# Patient Record
Sex: FEMALE | Employment: OTHER | URBAN - METROPOLITAN AREA
[De-identification: names, ages, dates, MRNs, and addresses within clinical notes are randomized per-mention and may not be internally consistent; named-entity substitution may affect disease eponyms.]

---

## 2024-03-19 ENCOUNTER — OFFICE VISIT (OUTPATIENT)
Dept: OBSTETRICS AND GYNECOLOGY | Facility: CLINIC | Age: 49
End: 2024-03-19
Payer: COMMERCIAL

## 2024-03-19 VITALS
WEIGHT: 166.4 LBS | BODY MASS INDEX: 27.72 KG/M2 | DIASTOLIC BLOOD PRESSURE: 84 MMHG | HEIGHT: 65 IN | SYSTOLIC BLOOD PRESSURE: 118 MMHG

## 2024-03-19 DIAGNOSIS — N95.1 MENOPAUSAL SYNDROME ON HORMONE REPLACEMENT THERAPY: Primary | ICD-10-CM

## 2024-03-19 DIAGNOSIS — Z79.890 MENOPAUSAL SYNDROME ON HORMONE REPLACEMENT THERAPY: Primary | ICD-10-CM

## 2024-03-19 PROCEDURE — 99204 OFFICE O/P NEW MOD 45 MIN: CPT | Performed by: NURSE PRACTITIONER

## 2024-03-19 RX ORDER — PROGESTERONE 200 MG/1
CAPSULE ORAL
Qty: 14 CAPSULE | Refills: 11 | Status: SHIPPED | OUTPATIENT
Start: 2024-03-19 | End: 2024-05-13 | Stop reason: SDUPTHER

## 2024-03-19 RX ORDER — ESTRADIOL 1 MG/1
1 TABLET ORAL DAILY
Qty: 30 TABLET | Refills: 11 | Status: SHIPPED | OUTPATIENT
Start: 2024-03-19 | End: 2024-05-14

## 2024-03-19 ASSESSMENT — ENCOUNTER SYMPTOMS
EYES NEGATIVE: 0
ENDOCRINE NEGATIVE: 0
ALLERGIC/IMMUNOLOGIC NEGATIVE: 0
GASTROINTESTINAL NEGATIVE: 0
HEMATOLOGIC/LYMPHATIC NEGATIVE: 0
CONSTITUTIONAL NEGATIVE: 0
CARDIOVASCULAR NEGATIVE: 0
MUSCULOSKELETAL NEGATIVE: 0
RESPIRATORY NEGATIVE: 0
NEUROLOGICAL NEGATIVE: 0
PSYCHIATRIC NEGATIVE: 0

## 2024-03-19 ASSESSMENT — PAIN SCALES - GENERAL: PAINLEVEL: 0-NO PAIN

## 2024-03-19 NOTE — PROGRESS NOTES
Subjective   Patient ID: Shantel Almazan is a 49 y.o. female who presents for Hormone Replacement .  HPI  Concerns:     Menopausal age:     Any Contraindications to HT: personal h/o breast cancer, estrogen sensitive cancer, dementia, stroke, MI, VTE or inherited high risk for VTE, SCAD:   h/o congenital heart disease (increased risk of DVT)     contraception:  vasectomy  HT:   use of OTC remedies:   bioidenticals:     Vaginal bleeding:    VMS:   Sleep difficulties:   Mood changes:   Joint pain:   Brain fog/difficulty concentrating:     GSM:   are you sexually active:   dyspareunia:   decrease in desire:   difficulty reaching orgasm:    Urinary Incontinence:        H/O Pregnancies:   Fractures:   H/O abnormal pap:     Lipids:    calcium scoring:      FH:   breast cancer:    ovarian cancer:   colon cancer:   pancreatic cancer:     Social history:  lives with:   H/O trauma:   occupation:    Exercise:    weight bearing:      Review of Systems    Objective   Physical Exam    Assessment/Plan   {Assess/PlanSmartLinks:45817}         CIRILO Rojo-CNP 03/19/24 10:24 AM

## 2024-03-19 NOTE — PROGRESS NOTES
Subjective   Patient ID: Shantel Almazan is a 49 y.o. female who presents for Hormone Replacement .  HPI      Menopausal age: perimenopausal - still getting regular monthly periods    Any Contraindications to HT: personal h/o breast cancer, estrogen sensitive cancer, dementia, stroke, MI, VTE or inherited high risk for VTE, SCAD: none  h/o congenital heart disease (increased risk of DVT)- no    contraception:  vasectomy  HT: none  use of OTC remedies: none  bioidenticals: none    Vaginal bleeding:  regular and monthly- states they are shorter and heavier   VMS: yes  Sleep difficulties: yes  Mood changes: yes  Joint pain: no  Brain fog/difficulty concentrating: yes    GSM: yes  are you sexually active: yes  dyspareunia: sometimes  decrease in desire: yes  difficulty reaching orgasm: yes  Urinary Incontinence: none       Fractures: no  H/O abnormal pap: no       FH:   breast cancer:  no  ovarian cancer: no  colon cancer: no  pancreatic cancer: no    Social history:  lives with:  and 2 daughters  H/O trauma: no  occupation:  psychologist  Exercise: yes-pilates, walks and runs       Review of Systems    Objective   Physical Exam    Assessment/Plan   Diagnoses and all orders for this visit:  Menopausal syndrome on hormone replacement therapy  -     progesterone (Prometrium) 200 mg capsule; take one pill by mouth at bedtime for 14 days/month, starting 4/1/24  -     estradiol (Estrace) 1 mg tablet; Take 1 tablet (1 mg) by mouth once daily.    -Patient states she would like to start at the 1 mg PO estradiol dose. Side effects such as headache, bloating and breast tenderness discussed with patient.    -Patient to begin progesterone 200 mg capsule at bedtime on April 1, 2024 for 14 days. Educated patient that she can do this at the beginning of every month for 14 days. Asked patient to watch her bleeding pattern and reach out if anything changes.  -Patient to follow up in 6 weeks on how things are going.  -Patient  asked to reach out if questions or concerns arise.     MCKINLEY PEARSON 03/19/24 10:27 AM

## 2024-04-19 ENCOUNTER — HOSPITAL ENCOUNTER (OUTPATIENT)
Dept: RADIOLOGY | Facility: CLINIC | Age: 49
Discharge: HOME | End: 2024-04-19
Payer: COMMERCIAL

## 2024-04-19 VITALS — BODY MASS INDEX: 26.29 KG/M2 | WEIGHT: 158 LBS

## 2024-04-19 DIAGNOSIS — Z12.31 ENCOUNTER FOR SCREENING MAMMOGRAM FOR MALIGNANT NEOPLASM OF BREAST: ICD-10-CM

## 2024-04-19 PROCEDURE — 77067 SCR MAMMO BI INCL CAD: CPT

## 2024-04-19 PROCEDURE — 77067 SCR MAMMO BI INCL CAD: CPT | Mod: BILATERAL PROCEDURE | Performed by: RADIOLOGY

## 2024-04-19 PROCEDURE — 77063 BREAST TOMOSYNTHESIS BI: CPT | Mod: BILATERAL PROCEDURE | Performed by: RADIOLOGY

## 2024-04-22 ENCOUNTER — HOSPITAL ENCOUNTER (OUTPATIENT)
Dept: RADIOLOGY | Facility: EXTERNAL LOCATION | Age: 49
Discharge: HOME | End: 2024-04-22
Payer: COMMERCIAL

## 2024-04-29 ENCOUNTER — APPOINTMENT (OUTPATIENT)
Dept: OBSTETRICS AND GYNECOLOGY | Facility: CLINIC | Age: 49
End: 2024-04-29
Payer: COMMERCIAL

## 2024-05-13 ENCOUNTER — TELEMEDICINE (OUTPATIENT)
Dept: OBSTETRICS AND GYNECOLOGY | Facility: CLINIC | Age: 49
End: 2024-05-13
Payer: COMMERCIAL

## 2024-05-13 DIAGNOSIS — Z79.890 MENOPAUSAL SYNDROME ON HORMONE REPLACEMENT THERAPY: Primary | ICD-10-CM

## 2024-05-13 DIAGNOSIS — N95.1 MENOPAUSAL SYNDROME ON HORMONE REPLACEMENT THERAPY: Primary | ICD-10-CM

## 2024-05-13 PROCEDURE — 99441 PR PHYS/QHP TELEPHONE EVALUATION 5-10 MIN: CPT | Performed by: NURSE PRACTITIONER

## 2024-05-13 RX ORDER — PROGESTERONE 200 MG/1
200 CAPSULE ORAL DAILY
Qty: 30 CAPSULE | Refills: 11 | Status: SHIPPED | OUTPATIENT
Start: 2024-05-13

## 2024-05-13 RX ORDER — ESCITALOPRAM OXALATE 5 MG/1
5 TABLET ORAL
COMMUNITY
Start: 2023-06-06

## 2024-05-13 NOTE — PROGRESS NOTES
"Subjective   Patient ID: Shantel Almazan is a 49 y.o. female who presents for No chief complaint on file..  HPI  Follow up from 3/2024  Perimenopausal   VMS: yes  Sleep difficulties: yes  Mood changes: yes  Brain fog/difficulty concentrating: yes  GSM: yes  dyspareunia: sometimes  decrease in desire: yes  difficulty reaching orgasm: yes    Decision for 1mg po estradiol; 200mg po progesterone for 14 days/month    Today she states:  \"I definitely feel better\"  Restarted 5mg lexapro  Better sleep  VMS: for the 2 weeks she is off progesterone  Moods: improved  Weight loss  Brain fog: improved  GSM: improved     Review of Systems    Objective   Physical Exam    Assessment/Plan   Diagnoses and all orders for this visit:  Menopausal syndrome on hormone replacement therapy  -     progesterone (Prometrium) 200 mg capsule; Take 1 capsule (200 mg) by mouth once daily. take one pill by mouth at bedtime    Will continue with 1mg po estradiol; will increase progesterone from 200mg for 14 days/month to nighlty  Pt asked to contact me with an update in approximately one month       CIRILO Rojo-CNP 05/13/24 3:22 PM   "

## 2024-05-14 DIAGNOSIS — Z79.890 MENOPAUSAL SYNDROME ON HORMONE REPLACEMENT THERAPY: Primary | ICD-10-CM

## 2024-05-14 DIAGNOSIS — N95.1 MENOPAUSAL SYNDROME ON HORMONE REPLACEMENT THERAPY: Primary | ICD-10-CM

## 2024-05-14 RX ORDER — ESTRADIOL 1 MG/1
1 TABLET ORAL DAILY
Qty: 90 TABLET | Refills: 3 | Status: SHIPPED | OUTPATIENT
Start: 2024-05-14

## 2024-07-30 ENCOUNTER — APPOINTMENT (OUTPATIENT)
Dept: INTEGRATIVE MEDICINE | Facility: CLINIC | Age: 49
End: 2024-07-30

## 2024-07-30 PROCEDURE — MASS60G MASSAGE 60 MINUTES: Performed by: MASSAGE THERAPIST

## 2024-07-30 PROCEDURE — CYTAX SALES TAX CUYAHOGA COUNT: Performed by: MASSAGE THERAPIST

## 2024-07-30 SDOH — SOCIAL STABILITY: SOCIAL NETWORK: I HAVE TROUBLE DOING ALL OF MY USUAL WORK (INCLUDE WORK AT HOME): SOMETIMES

## 2024-07-30 SDOH — SOCIAL STABILITY: SOCIAL NETWORK: I HAVE TROUBLE DOING ALL OF MY REGULAR LEISURE ACTIVITIES WITH OTHERS: RARELY

## 2024-07-30 SDOH — ECONOMIC STABILITY: FOOD INSECURITY: WITHIN THE PAST 12 MONTHS, YOU WORRIED THAT YOUR FOOD WOULD RUN OUT BEFORE YOU GOT MONEY TO BUY MORE.: NEVER TRUE

## 2024-07-30 SDOH — SOCIAL STABILITY: SOCIAL NETWORK: I HAVE TROUBLE DOING ALL OF THE FAMILY ACTIVITIES THAT I WANT TO DO: SOMETIMES

## 2024-07-30 SDOH — SOCIAL STABILITY: SOCIAL NETWORK: PROMIS ABILITY TO PARTICIPATE IN SOCIAL ROLES & ACTIVITIES T-SCORE: 48

## 2024-07-30 SDOH — SOCIAL STABILITY: SOCIAL NETWORK

## 2024-07-30 SDOH — ECONOMIC STABILITY: FOOD INSECURITY: WITHIN THE PAST 12 MONTHS, THE FOOD YOU BOUGHT JUST DIDN'T LAST AND YOU DIDN'T HAVE MONEY TO GET MORE.: NEVER TRUE

## 2024-07-30 SDOH — SOCIAL STABILITY: SOCIAL NETWORK: I HAVE TROUBLE DOING ALL OF THE ACTIVITIES WITH FRIENDS THAT I WANT TO DO: RARELY

## 2024-07-30 ASSESSMENT — PROMIS GLOBAL HEALTH SCALE
RATE_PHYSICAL_HEALTH: VERY GOOD
RATE_AVERAGE_FATIGUE: MILD
RATE_SOCIAL_SATISFACTION: GOOD
EMOTIONAL_PROBLEMS: RARELY
CARRYOUT_SOCIAL_ACTIVITIES: VERY GOOD
RATE_GENERAL_HEALTH: VERY GOOD
RATE_MENTAL_HEALTH: GOOD
CARRYOUT_PHYSICAL_ACTIVITIES: COMPLETELY
RATE_QUALITY_OF_LIFE: VERY GOOD
RATE_AVERAGE_PAIN: 3

## 2024-07-31 NOTE — PROGRESS NOTES
Massage Therapy Visit:     Shantel Almazan was self-referred.    Condition of Client Subjective :  Patient ID: Shantel Almazan is a 49 y.o. female who presents for reason for visit of Back Pain and Muscle tension release.  HPI    Session Information  Visit Type: New patient  Description of present complaint: Stress, Range of motion (ROM), Muscle tension, Discomfort, Fatigue, Chronic pain, Gait issues    Review of Systems    Objective   Pre-treatment Assessment  Condition of Client Note: First session.. Lumbar pain, tightness on neck, shoulders.. active individual in a regular exercise & wellness protocol... wants to add therapeutic massage as part of her pain management protocol, to lower pain levels with regular visits, along with PT sessions.    Physical Exam    Actions Assessment/Plan :    Massage Treatment  Patient Position: Table, Supine, Prone  Positioning Assistance: Did not need assistance, Pillow(s)/bolster under knees while supine, Pillow(s)/bolster under anles while prone  Massage Technique: Therapeutic massage, Relaxation massage, Myofascial release, Superficial fascial release, Post-isometric muscle release (PNF), Stretching, Soft tissue mobilization, Cranio-sacral therapy  Area/Body Region: Upper body  Pressure Scale: 2 - Mild pressure, 3 - Medium pressure, 4 - Moderate pressure  Action Note: Supine, upper body, kneading, stripping, palming, effleurage, petrissage, cross fiber, on Cervicals, Masseters, Platysma, SCM, SITS, Levator Scapulae, Trapezius, Rhomboids, upper Pectoralis, Deltoids.  UE, stretching, cross fiber, palming, kneading, effleurage, on Deltoids, Biceps, Triceps, forearm muscles, hand muscles.  LE, kneading, stripping, palming, knuckle, effleurage, petrissage, cross fiber, stretching, on Vastus group, TFL, lateral approach to Hip Flexors, Longissimus, Paraspinals, Iliocostalis, Hamstrings, Sartorius, Gracilis.   Prone, Relaxation massage techniques: kneading, stripping, palming,  knuckle, effleurage, petrissage, cross fiber, stretching, on SI joint, upper Gluteus Max, erectors, Longissimus, QL, Obliques, paraspinals, SITS. Cervical, Trapezius.    Response:  Post-treatment Assessment  Patient Noted Improvement of the Following Symptoms: Muscle tension    Evaluation:   Massage Therapy Evaluation / Recommendation(s) / Follow-up  Post-Treatment Recommendation: Increase hydration  Follow-up: Follow up scheduled.

## 2024-08-27 ENCOUNTER — APPOINTMENT (OUTPATIENT)
Dept: INTEGRATIVE MEDICINE | Facility: CLINIC | Age: 49
End: 2024-08-27
Payer: COMMERCIAL

## 2024-08-27 PROCEDURE — MASS60G MASSAGE 60 MINUTES: Performed by: MASSAGE THERAPIST

## 2024-08-27 PROCEDURE — CYTAX SALES TAX CUYAHOGA COUNT: Performed by: MASSAGE THERAPIST

## 2024-08-27 NOTE — PROGRESS NOTES
Massage Therapy Visit:     Shantel Almazan was self-referred.    Condition of Client Subjective :  Patient ID: Shantel Almazan is a 49 y.o. female who presents for reason for visit of Relaxation massage.  HPI    Session Information  Description of present complaint: Discomfort, Fatigue, Range of motion (ROM), Muscle tension, Stress    Review of Systems    Objective   Pre-treatment Assessment  Condition of Client Note: Shantel shared she felt worse after the first session, but the pain subsided eventually.  For today, she is requesting a Relaxation massage since her back and shoulders are not in pain at this time..  From the First session.. Lumbar pain, tightness on neck, shoulders.. active individual in a regular exercise & wellness protocol... wants to add therapeutic massage as part of her pain management protocol, to lower pain levels with regular visits, along with PT sessions.    Physical Exam    Actions Assessment/Plan :    Massage Treatment  Patient Position: Table, Supine, Prone  Positioning Assistance: Did not need assistance, Pillow(s)/bolster under knees while supine, Pillow(s)/bolster under anles while prone  Massage Technique: Therapeutic massage, Nurturing touch, Stretching, Soft tissue mobilization, Relaxation massage  Pressure Scale: 2 - Mild pressure, 3 - Medium pressure, 4 - Moderate pressure  Action Note: Supine,  Cervical/upper body, kneading, stripping, palming, effleurage, petrissage, cross fiber, on Platysma, occipitals, temporalis, frontalis, masseters, SCM, SITS, Levator Scapulae, Trapezius, Rhomboids, upper Pectoralis, Deltoids.  UE, stretching, cross fiber, palming, kneading, effleurage, on Deltoids, Biceps, Triceps, forearm muscles, hand muscles.  LE, kneading, stripping, palming, knuckle, effleurage, petrissage, cross fiber, stretching, on Vastus group, TFL, Hamstrings, Sartorius, Gracilis, Gastrocnemius, Soleus, Tibialis, plantar/dorsal aspect of foot.  Prone, kneading, stripping,  palming, knuckle, effleurage, petrissage, cross fiber, stretching, on SI joint, erectors, Longissimus, QL, Obliques, paraspinals, SITS.    Response:  Post-treatment Assessment  Patient Noted Improvement of the Following Symptoms: Muscle tension  Response Note: Shantel said she feels very relaxed after the session    Evaluation:   Massage Therapy Evaluation / Recommendation(s) / Follow-up  Post-Treatment Recommendation: Increase hydration  Follow-up: Follow up scheduled

## 2024-09-20 ENCOUNTER — APPOINTMENT (OUTPATIENT)
Dept: OBSTETRICS AND GYNECOLOGY | Facility: CLINIC | Age: 49
End: 2024-09-20
Payer: COMMERCIAL

## 2024-09-20 VITALS
WEIGHT: 167 LBS | DIASTOLIC BLOOD PRESSURE: 68 MMHG | HEART RATE: 61 BPM | SYSTOLIC BLOOD PRESSURE: 107 MMHG | HEIGHT: 65 IN | BODY MASS INDEX: 27.82 KG/M2

## 2024-09-20 DIAGNOSIS — Z01.419 ENCOUNTER FOR GYNECOLOGICAL EXAMINATION WITHOUT ABNORMAL FINDING: Primary | ICD-10-CM

## 2024-09-20 PROCEDURE — 1036F TOBACCO NON-USER: CPT | Performed by: OBSTETRICS & GYNECOLOGY

## 2024-09-20 PROCEDURE — 3008F BODY MASS INDEX DOCD: CPT | Performed by: OBSTETRICS & GYNECOLOGY

## 2024-09-20 PROCEDURE — 99396 PREV VISIT EST AGE 40-64: CPT | Performed by: OBSTETRICS & GYNECOLOGY

## 2024-09-20 RX ORDER — CETIRIZINE HYDROCHLORIDE 10 MG/1
10 TABLET, ORALLY DISINTEGRATING ORAL
COMMUNITY

## 2024-09-20 NOTE — PROGRESS NOTES
Subjective   Shantel Almazan is a 49 y.o. female here for a routine exam.  She had noted menopausal symptoms and has started estradiol 1 mg daily, and now daily progesterone 200 mg with Zeus Levi.  She still has regular cycles.  She has her 's vasectomy for contraception.    Review of the chart notes a normal ultrasound and CT scan in , no fibroids or ovarian masses.    She has IBS and no change in bowel habits.  She is current on her colonoscopy.    There is no dysuria, no vaginal discharge.  No pelvic pain.    We discussed her oldest daughter just started at [employer].     Personal health questionnaire reviewed: yes.     Gynecologic History  Patient's last menstrual period was 2024 (exact date).  Contraception: vasectomy  Last Pap: 23. Results were: normal  Last mammogram: 24. Results were: normal    Obstetric History  OB History    Para Term  AB Living   5 2 2   3     SAB IAB Ectopic Multiple Live Births                  # Outcome Date GA Lbr Jerry/2nd Weight Sex Type Anes PTL Lv   5 AB            4 AB            3 AB            2 Term            1 Term                Objective   Constitutional: Alert and in no acute distress. Well developed, well nourished.   Head and Face: Head and face: Normal.    Eyes: Normal external exam - nonicteric sclera, extraocular movements intact (EOMI) and no ptosis.   Neck: No neck asymmetry. Supple. Thyroid not enlarged and there were no palpable thyroid nodules.    Pulmonary: No respiratory distress.   Chest: Breasts: Normal appearance, no nipple discharge and no skin changes. Palpation of breasts and axillae: No palpable mass and no axillary lymphadenopathy.   Abdomen: Soft nontender; no abdominal mass palpated. No organomegaly. No hernias.   Genitourinary: External genitalia: Normal. No inguinal lymphadenopathy. Bartholin's Urethral and Skenes Glands: Normal. Urethra: Normal.  Bladder: Normal on palpation. Vagina: Normal. Cervix:  Normal.  Uterus: Normal.  Right Adnexa/parametria: Normal.  Left Adnexa/parametria: Normal.  Inspection of Perianal Area: Normal.   Musculoskeletal: No joint swelling seen, normal movements of all extremities.   Skin: Normal skin color and pigmentation, normal skin turgor, and no rash.   Neurologic: Non-focal. Grossly intact.   Psychiatric: Alert and oriented x 3. Affect normal to patient baseline. Mood: Appropriate.  Physical Exam     Assessment/Plan   Healthy female exam.  This is a 49-year-old female with a normal exam.  No Pap was sent, she is high risk HPV negative in 2023.    Her routine mammogram is due in April 2025.    We discussed vulvar irritation which she attributes to exercise, no lesions were seen.  I recommend topical Aquaphor.    I will see her in 1 year.  Mammogram ordered.

## 2024-10-01 ENCOUNTER — APPOINTMENT (OUTPATIENT)
Dept: INTEGRATIVE MEDICINE | Facility: CLINIC | Age: 49
End: 2024-10-01
Payer: COMMERCIAL

## 2025-01-10 DIAGNOSIS — R63.5 WEIGHT GAIN: Primary | ICD-10-CM

## 2025-01-15 NOTE — PROGRESS NOTES
"  Patient ID: Shantel Almazan is a 49 y.o. female who presents for No chief complaint on file..    Referring Provider: Zeus Levi  Pt was referred for weight gain, on MHT     Preferred Pharmacy:    [facility]/pharmacy #[id number] - [place], [place] - [address] [address]  [address] [address] [address]  Phone: [phone] Fax: [phone]      Subjective    LMP:  12/24/24  Uterus: Yes  Ovaries: Yes    Movement:   Joint pain? No - has back pain/arthritis in the back (lumbar)  Osteopenia or osteoporosis: No    Lifestyle  Stress Level (rate 1-10): 2 out of 10  No results found for: \"CORTFREE\"  Energy Level (rate 1-10): 7 out of 10  Sleep? With MHT \"Amazing\" prior was \"rough\"   Sleep apnea?  report she snores   Daytime brain fog/memory troubles? Yes  How many steps/day on average? Running 6 miles/week, walking 6 miles/week  Recommend 7,000 steps per day, 7 days per week. Use a tracker for this such as Tern, fit bit, [employer], etc.  Strength training? Piliates 5-6 times/week  Recommend 2-3x/week for 20 minutes  Consider listening to podcast interviews with Mily Gao  Current dietary pattern (if following a specific pattern of eating):   Gluten free (gluten sensitive, daughter celiac), has on occasion  Has been trying to increase protein and lower carbohydrates  Has added a high protein breakfast      Toxins:  Tobacco? No  Alcohol? Occasionally - 1x/month socially  THC/CBD? No  Heating food in plastic? YES  Plastic water bottles? YES    History of Gout? No- soda 1x/day of coke zero  BP Readings from Last 3 Encounters:   09/20/24 107/68   03/19/24 118/84   03/11/22 120/82     No results found for: \"URICACID\"    If yes or high fructose diet (soda, hfcs, agave, fruit juice, etc.) or high animal based protein diet order uric acid level.   Uric acid levels above 5.5 mg/dL are linked to increased risk of metabolic diseases.   Fructose metabolism generates uric acid, signaling to the body to increase storage of fat. " "  Uric acid decreases nitric oxide, constricting blood vessels and impairing insulin function.   High sodium intake can increase uric acid by signaling dehydration, but staying hydrated dilutes sodiums effect.   Elevated levels of uric acid increase hunger and impulsivity.     Medications potentially contributing to weight gain:   Escitalopram  Has noticed weight gain since starting, reduced from 10mg to 5mg to see if would help, weight maintained    Medications tried/stopped for weight management:    None       Weight Loss Drug Therapy Options Screening:     Naltrexone and Bupropion  Binge eating: No  Depression: Yes- currently on medication  PMH of Suicidal Ideation: No  Avoid use in patients with:  Uncontrolled hypertension:   Seizure disorder: no  Eating disorder: no  Use of other bupropion-containing products: no  Chronic opioid use: no  Avoid use if on opioids. Patients treated with naltrexone may respond to lower opioid doses than previously used. This could result in potentially life-threatening opioid intoxication. Warn patients that any attempt to overcome opioid blockade during naltrexone therapy is dangerous and could potentially lead to fatal opioid overdose.  Within 14 days of monoamine oxidase inhibitors: no    GLP-1 and Metformin:     Pre-Diabetes/Diabetes? No- family history  No results found for: \"INSULFAST\", \"GLUF\", \"TU1RWSQX\", \"HGBA1C\", \"LEPTIN\"  No results found for: \"HSCRP\"    Pertinent PMH Review:   PMH of Pancreatitis: No  PMH of Gallbladder disease: No  PMH of Delayed Gastric Emptying:   GI issues? IBS, gluten sensitive  Frequency of BM? Diarrhea daily life long, made worse by caffeine  PMH of MTC: NO  PMH of Retinopathy: No  Any plans for a pregnancy in the next year? No    Topiramate:   Migraines? None    Adipex:   Anxiety? Yes    Thyroid health:   No results found for: \"TSH\", \"FREET4\", \"T3FREE\", \"T3\", \"THYROIDPAB\"     MHT- on MHT tolerating well, no side effects  No results found for: " "\"ESTRADIOL\", \"TESTOST\", \"MFTEST\", \"MTESTO\", \"FSH\"  Any Contraindications and/or Cautions to HT:     Concurrent Chronic Medical Conditions    Cardiovascular Health  The ASCVD Risk score (Katty RG, et al., 2019) failed to calculate for the following reasons:    Cannot find a previous HDL lab    Cannot find a previous total cholesterol lab  No results found for: \"CHOL\"  No results found for: \"HDL\"  No results found for: \"TRIG\"     No results found for: \"SBP\", \"DBP\"    Iron/B12/Folate Status  No results found for: \"IRON\", \"TIBC\", \"FERRITIN\"   No results found for: \"GOFKFXUM90\", \"FOLATE\"    Liver Function  No results found for: \"AST\", \"ALT\", \"GGT\"     Kidney Function  No results found for: \"GFRF\", \"CREATININE\", \"ALBCREA\"    Vitamin D3  No results found for: \"VITD25\"    Current Outpatient Medications on File Prior to Visit   Medication Sig Dispense Refill    cetirizine (ZyrTEC) 10 mg tablet,disintegrating Take 10 mg by mouth once daily.      escitalopram (Lexapro) 5 mg tablet Take 1 tablet (5 mg) by mouth once daily.      estradiol (Estrace) 1 mg tablet Take 1 tablet (1 mg) by mouth once daily. 90 tablet 3    progesterone (Prometrium) 200 mg capsule Take 1 capsule (200 mg) by mouth once daily. take one pill by mouth at bedtime 30 capsule 11     No current facility-administered medications on file prior to visit.        Lifestyle and Nourishment Recommendations (please review following our appointment)  Focus on whole foods as close to nature as possible (less than 5 ingredients on the label)  The order of eating matters during each meal, in order to minimize blood sugar spikes  First, 1/3 to 1/2 of meal as colorful vegetables eaten first  Increase fiber 25-30 grams daily (work up slowly to avoid GI distress)  Goal: Daily bowel movement  Second, eat protein and fat as part of your meal  Increase high quality protein to 25-30 grams per meal along with 2-3 x/week resistance training  May consider 3g/day of Creatine " (CreaPure) daily mixed in water to support mood, cognition, muscle mass, and bone mass.    Third, have complex carbohydrates/starches as part of your meal  Consider drinking 1 tbsp of apple cider vinegar in a tall glass of water or as a salad dressing with extra virgin olive oil up to 20 minutes prior to or during a meal.   Helps to reduce blood sugar spikes from complex carbohydrates by up to 30%.   Beverages: Focus on filtered water, organic tea (loose leave or in paper, avoid plastic sachets), or sparkling/mineral water (ex. Spindrift, Oscar)   No more than 1 cup (8oz) of organic coffee daily prior to noon. May consider organic green tea.   Avoid alcohol   Avoid ALL artificial sweeteners   Focus on building muscle- muscle is metabolic tissue  Strength training 2-3 times weekly for at least 20 minutes, max out at 8 reps, rest, repeat x 3.   Walk or move for 10 minutes after each meal - ideally outdoors  Goal: 7,000 steps per day, 7 days per week. Use a tracker for this such as iwi, fit bit, [employer], etc.   Manage stress: minimum of 2-5 minutes daily for meditation, minfulness, etc. to reduce cortisol levels.  Prioritize 7-9 hours of restful sleep nightly.   Turn off electronics 1 hour prior to bedtime, no electronics in the bedroom.  Establish a regular sleep/wake time (+/- 30 minutes)     Medication and allergy reconciliation completed     Drug Interactions   No significant drug interactions identified    Assessment/Plan     Patients goals:   Be in better health, avoid diabetes  Would like clothes to fit more comfortably   Discussed patients history, current medical conditions, medications, as well as current diet and lifestyle in depth.   Patient has been making diet/lifestyle changes for: >15 years  Current weight: 170 lbs  BMI: 28  Desired weight: 140-145 lbs  Concurrent medical conditions: Recently has started snoring. IBS.   Lifestyle:   Please review the above lifestyle and nourishment recommendations  between now and our next appointment.   Please choose 1-3 items you are not currently doing to implement.   Very active, piliates 5-6 days/week  History of dieting when younger, trying to include mindfulness and dieting now, has two daughters.   Switch from heating food in plastic and plastic water bottles to glass or stainless steal      LABS  Fasting insulin  Fasting glucose  A1C  TSH  Free T4  TPO  Vitamin D      Follow-up: 2/6/25 1:40pm     Time spent with pt: Total length of time 40 (minutes) of the encounter and more than 50% was spent counseling the patient.    Talia Pruitt, Pharm.D, FAIHM, IFP, [facility], [facility]  Clinical Pharmacist  Pharmacy Services  [phone]    Continue all meds under the continuation of care with the referring provider and clinical pharmacy team.    Verbal consent to manage patient's drug therapy was obtained from the patient and/or an individual authorized to act on behalf of a patient. They were informed they may decline to participate or withdraw from participation in pharmacy services at any time.

## 2025-01-16 ENCOUNTER — TELEMEDICINE (OUTPATIENT)
Dept: PHARMACY | Facility: HOSPITAL | Age: 50
End: 2025-01-16
Payer: COMMERCIAL

## 2025-01-16 DIAGNOSIS — R63.5 WEIGHT GAIN: ICD-10-CM

## 2025-01-17 ENCOUNTER — LAB (OUTPATIENT)
Dept: LAB | Facility: LAB | Age: 50
End: 2025-01-17
Payer: COMMERCIAL

## 2025-01-17 DIAGNOSIS — R63.5 WEIGHT GAIN: ICD-10-CM

## 2025-01-17 LAB
25(OH)D3 SERPL-MCNC: 43 NG/ML (ref 30–100)
ALBUMIN SERPL BCP-MCNC: 3.9 G/DL (ref 3.4–5)
ALP SERPL-CCNC: 68 U/L (ref 33–110)
ALT SERPL W P-5'-P-CCNC: 24 U/L (ref 7–45)
ANION GAP SERPL CALC-SCNC: 13 MMOL/L (ref 10–20)
AST SERPL W P-5'-P-CCNC: 20 U/L (ref 9–39)
BILIRUB SERPL-MCNC: 0.4 MG/DL (ref 0–1.2)
BUN SERPL-MCNC: 12 MG/DL (ref 6–23)
CALCIUM SERPL-MCNC: 9.6 MG/DL (ref 8.6–10.6)
CHLORIDE SERPL-SCNC: 102 MMOL/L (ref 98–107)
CO2 SERPL-SCNC: 26 MMOL/L (ref 21–32)
CREAT SERPL-MCNC: 0.9 MG/DL (ref 0.5–1.05)
EGFRCR SERPLBLD CKD-EPI 2021: 79 ML/MIN/1.73M*2
EST. AVERAGE GLUCOSE BLD GHB EST-MCNC: 97 MG/DL
GLUCOSE SERPL-MCNC: 86 MG/DL (ref 74–99)
HBA1C MFR BLD: 5 %
INSULIN P FAST SERPL-ACNC: 8 UIU/ML (ref 3–25)
POTASSIUM SERPL-SCNC: 4.3 MMOL/L (ref 3.5–5.3)
PROT SERPL-MCNC: 6.7 G/DL (ref 6.4–8.2)
SODIUM SERPL-SCNC: 137 MMOL/L (ref 136–145)
T4 FREE SERPL-MCNC: 1.08 NG/DL (ref 0.78–1.48)
THYROPEROXIDASE AB SERPL-ACNC: <28 IU/ML
TSH SERPL-ACNC: 2.26 MIU/L (ref 0.44–3.98)

## 2025-01-17 PROCEDURE — 84439 ASSAY OF FREE THYROXINE: CPT

## 2025-01-17 PROCEDURE — 84443 ASSAY THYROID STIM HORMONE: CPT

## 2025-01-17 PROCEDURE — 80053 COMPREHEN METABOLIC PANEL: CPT

## 2025-01-17 PROCEDURE — 83525 ASSAY OF INSULIN: CPT

## 2025-01-17 PROCEDURE — 86376 MICROSOMAL ANTIBODY EACH: CPT

## 2025-01-17 PROCEDURE — 83036 HEMOGLOBIN GLYCOSYLATED A1C: CPT

## 2025-01-17 PROCEDURE — 82306 VITAMIN D 25 HYDROXY: CPT

## 2025-02-05 NOTE — PROGRESS NOTES
"  Patient ID: Shantel Almazan is a 50 y.o. female who presents for No chief complaint on file..    Referring Provider: Zeus Levi  Pt was referred for weight gain, on MHT     Preferred Pharmacy:    [facility]/pharmacy #[id number] - [place], [place] - [address] [facility]  [address] [facility] [address]  Phone: [phone] Fax: [phone]      Subjective    LMP:  12/24/24  Uterus: Yes  Ovaries: Yes    Movement:   Joint pain? No - has back pain/arthritis in the back (lumbar)  Osteopenia or osteoporosis: No    Lifestyle  Stress Level (rate 1-10): 2 out of 10  No results found for: \"CORTFREE\"  Energy Level (rate 1-10): 7 out of 10  Sleep? With MHT \"Amazing\" prior was \"rough\"   Sleep apnea?  report she snores   Daytime brain fog/memory troubles? Yes  How many steps/day on average? Running 6 miles/week, walking 6 miles/week  Recommend 7,000 steps per day, 7 days per week. Use a tracker for this such as [employer], fit bit, [employer], etc.  Strength training? Piliates 5-6 times/week  Recommend 2-3x/week for 20 minutes  Consider listening to podcast interviews with Mily Gao  Current dietary pattern (if following a specific pattern of eating):   Gluten free (gluten sensitive, daughter celiac), has on occasion  Has been trying to increase protein and lower carbohydrates  Has added a high protein breakfast      Toxins:  Tobacco? No  Alcohol? Occasionally - 1x/month socially  THC/CBD? No  Heating food in plastic? YES  Plastic water bottles? YES    History of Gout? No- soda 1x/day of coke zero  BP Readings from Last 3 Encounters:   09/20/24 107/68   03/19/24 118/84   03/11/22 120/82     No results found for: \"URICACID\"    If yes or high fructose diet (soda, hfcs, agave, fruit juice, etc.) or high animal based protein diet order uric acid level.   Uric acid levels above 5.5 mg/dL are linked to increased risk of metabolic diseases.   Fructose metabolism generates uric acid, signaling to the body to increase storage of fat. " "  Uric acid decreases nitric oxide, constricting blood vessels and impairing insulin function.   High sodium intake can increase uric acid by signaling dehydration, but staying hydrated dilutes sodiums effect.   Elevated levels of uric acid increase hunger and impulsivity.     Medications potentially contributing to weight gain:   Escitalopram  Has noticed weight gain since starting, reduced from 10mg to 5mg to see if would help, weight maintained    Medications tried/stopped for weight management:    None       Weight Loss Drug Therapy Options Screening:     Naltrexone and Bupropion  Binge eating: No  Depression: Yes- currently on medication  PMH of Suicidal Ideation: No  Avoid use in patients with:  Uncontrolled hypertension:   Seizure disorder: no  Eating disorder: no  Use of other bupropion-containing products: no  Chronic opioid use: no  Avoid use if on opioids. Patients treated with naltrexone may respond to lower opioid doses than previously used. This could result in potentially life-threatening opioid intoxication. Warn patients that any attempt to overcome opioid blockade during naltrexone therapy is dangerous and could potentially lead to fatal opioid overdose.  Within 14 days of monoamine oxidase inhibitors: no    GLP-1 and Metformin:     Pre-Diabetes/Diabetes? No- family history  Lab Results   Component Value Date    INSULFAST 8 01/17/2025    HGBA1C 5.0 01/17/2025     No results found for: \"HSCRP\"    Pertinent PMH Review:   PMH of Pancreatitis: No  PMH of Gallbladder disease: No  PMH of Delayed Gastric Emptying:   GI issues? IBS, gluten sensitive  Frequency of BM? Diarrhea daily life long, made worse by caffeine  PMH of MTC: NO  PMH of Retinopathy: No  Any plans for a pregnancy in the next year? No    Topiramate:   Migraines? None    Adipex:   Anxiety? Yes    Thyroid health:   Lab Results   Component Value Date    TSH 2.26 01/17/2025    FREET4 1.08 01/17/2025    THYROIDPAB <28 01/17/2025        MHT- on " "MHT tolerating well, no side effects  No results found for: \"ESTRADIOL\", \"TESTOST\", \"MFTEST\", \"MTESTO\", \"FSH\"  Any Contraindications and/or Cautions to HT:     Concurrent Chronic Medical Conditions    Cardiovascular Health  The ASCVD Risk score (Katty RG, et al., 2019) failed to calculate for the following reasons:    Cannot find a previous HDL lab    Cannot find a previous total cholesterol lab  No results found for: \"CHOL\"  No results found for: \"HDL\"  No results found for: \"TRIG\"     No results found for: \"SBP\", \"DBP\"    Iron/B12/Folate Status  No results found for: \"IRON\", \"TIBC\", \"FERRITIN\"   No results found for: \"JMFPTDOB92\", \"FOLATE\"    Liver Function  Lab Results   Component Value Date    AST 20 01/17/2025    ALT 24 01/17/2025        Kidney Function  Lab Results   Component Value Date    CREATININE 0.90 01/17/2025       Vitamin D3  Lab Results   Component Value Date    VITD25 43 01/17/2025       Current Outpatient Medications on File Prior to Visit   Medication Sig Dispense Refill    cetirizine (ZyrTEC) 10 mg tablet,disintegrating Take 10 mg by mouth once daily.      escitalopram (Lexapro) 5 mg tablet Take 1 tablet (5 mg) by mouth once daily.      estradiol (Estrace) 1 mg tablet Take 1 tablet (1 mg) by mouth once daily. 90 tablet 3    progesterone (Prometrium) 200 mg capsule Take 1 capsule (200 mg) by mouth once daily. take one pill by mouth at bedtime 30 capsule 11     No current facility-administered medications on file prior to visit.        Lifestyle and Nourishment Recommendations (please review following our appointment)  Focus on whole foods as close to nature as possible (less than 5 ingredients on the label)  The order of eating matters during each meal, in order to minimize blood sugar spikes  First, 1/3 to 1/2 of meal as colorful vegetables eaten first  Increase fiber 25-30 grams daily (work up slowly to avoid GI distress)  Goal: Daily bowel movement  Second, eat protein and fat as part of your " meal  Increase high quality protein to 25-30 grams per meal along with 2-3 x/week resistance training  May consider 3g/day of Creatine (CreaPure) daily mixed in water to support mood, cognition, muscle mass, and bone mass.    Third, have complex carbohydrates/starches as part of your meal  Consider drinking 1 tbsp of apple cider vinegar in a tall glass of water or as a salad dressing with extra virgin olive oil up to 20 minutes prior to or during a meal.   Helps to reduce blood sugar spikes from complex carbohydrates by up to 30%.   Beverages: Focus on filtered water, organic tea (loose leave or in paper, avoid plastic sachets), or sparkling/mineral water (ex. Spindrift, Oscar)   No more than 1 cup (8oz) of organic coffee daily prior to noon. May consider organic green tea.   Avoid alcohol   Avoid ALL artificial sweeteners   Focus on building muscle- muscle is metabolic tissue  Strength training 2-3 times weekly for at least 20 minutes, max out at 8 reps, rest, repeat x 3.   Walk or move for 10 minutes after each meal - ideally outdoors  Goal: 7,000 steps per day, 7 days per week. Use a tracker for this such as oura, fit bit, whoop, etc.   Manage stress: minimum of 2-5 minutes daily for meditation, minfulness, etc. to reduce cortisol levels.  Prioritize 7-9 hours of restful sleep nightly.   Turn off electronics 1 hour prior to bedtime, no electronics in the bedroom.  Establish a regular sleep/wake time (+/- 30 minutes)     Medication and allergy reconciliation completed     Drug Interactions   No significant drug interactions identified    Assessment/Plan     Patients goals:   Be in better health, avoid diabetes  Would like clothes to fit more comfortably   Discussed patients history, current medical conditions, medications, as well as current diet and lifestyle in depth.   Patient has been making diet/lifestyle changes for: >15 years  Current weight: 171 lbs  BMI: 28  Desired weight: 140-145 lbs  Concurrent  medical conditions: Recently has started snoring (sleep apnea). IBS.   LARISA-IR 1.8, >2 indicates potential insulin resistance  Lifestyle:   Very active, piliates 5-6 days/week  History of dieting when younger, trying to include mindfulness and dieting now, has two daughters.   Has been avoiding plastic and stopped soda since our last visit.   + 3 pounds since our last visit, had menses that was unusual.     GLP-1 Education    GLP-1 medications work by stimulating insulin release from the pancreas, slowing gastric emptying, inhibiting post meal glucagon release, and reducing appetite.     Discussed benefits of GLP-1 including lowering blood sugar, blood pressure, improving lipid profile, improving fatty liver disease, reducing the risk of heart disease and kidney disease, weight loss, and delaying the progression of diabetes-related nephropathy.     40-50% of weight loss may come from lean muscle mass. It is very important to continue regular exercise including activities that maintain/build muscle mass while taking this medication.     Side effects could include but are not limited to low blood sugar (hypoglycemia), loss of appetite, nausea, vomiting, diarrhea, and delayed gastric emptying.   Encouraged smaller meals and avoiding high fat meals to minimize nausea.   Discussed treating hypoglycemia with 15 grams of carbohydrates (1/2 glass of juice, piece of fruit, 3-4 glucose tablets)    Rare but serious side effects could include but are not limited to gallbladder disease, pancreatitis, medullary thyroid cancer, acute kidney injury, worsening diabetes related retinopathy, gastroparesis, and bowel obstruction.     If you would experience increased depression or suicidal thoughts while taking this medication contact your PCP immediately.     GLP-1 medications are not safe to take during pregnancy. If you would become pregnant while on a GLP-1 discontinue immediately.      Zepbound Education:     Counseled patient on  Zepbound MOA, expectations, side effects, duration of therapy, administration, and monitoring parameters.  Provided detailed dosing and administration counseling to ensure proper technique.   Reviewed Zepbound titration schedule, starting with 2.5 mg once weekly to a goal of 15 mg once weekly if tolerated  Counseled patient on the benefits of GLP-1ra glycemic control and weight loss  Reviewed storage requirements of Zepbound when not in use, and when to administer the medication if a dose is missed.  Advised patient that they may experience improved satiety after meals and portion sizes of meals may be reduced as doses of Zepound increase.        Follow-up: 3/17/25 11:40am     Time spent with pt: Total length of time 30 (minutes) of the encounter and more than 50% was spent counseling the patient.    Talia Pruitt, Pharm.D, FAM, IFP, [facility]  Clinical Pharmacist  Pharmacy Services  [phone]    Continue all meds under the continuation of care with the referring provider and clinical pharmacy team.    Verbal consent to manage patient's drug therapy was obtained from the patient and/or an individual authorized to act on behalf of a patient. They were informed they may decline to participate or withdraw from participation in pharmacy services at any time.

## 2025-02-06 ENCOUNTER — APPOINTMENT (OUTPATIENT)
Dept: PHARMACY | Facility: HOSPITAL | Age: 50
End: 2025-02-06
Payer: COMMERCIAL

## 2025-02-06 DIAGNOSIS — G47.30 SLEEP APNEA, UNSPECIFIED TYPE: Primary | ICD-10-CM

## 2025-02-06 DIAGNOSIS — R63.5 WEIGHT GAIN: ICD-10-CM

## 2025-02-06 RX ORDER — TIRZEPATIDE 2.5 MG/.5ML
2.5 INJECTION, SOLUTION SUBCUTANEOUS
Qty: 2 ML | Refills: 11 | Status: SHIPPED | OUTPATIENT
Start: 2025-02-06

## 2025-03-17 ENCOUNTER — APPOINTMENT (OUTPATIENT)
Dept: PHARMACY | Facility: HOSPITAL | Age: 50
End: 2025-03-17
Payer: COMMERCIAL

## 2025-03-17 DIAGNOSIS — G47.30 SLEEP APNEA, UNSPECIFIED TYPE: ICD-10-CM

## 2025-03-17 DIAGNOSIS — R63.5 WEIGHT GAIN: ICD-10-CM

## 2025-03-17 NOTE — PROGRESS NOTES
"  Patient ID: Shantel Almazan is a 50 y.o. female who presents for No chief complaint on file..    Referring Provider: Zeus Levi  Pt was referred for weight gain, on MHT     Preferred Pharmacy:    [facility]/pharmacy #[id number] - [place], [place] - [address] [facility]  [address] [facility] [address]  Phone: [phone] Fax: [phone]    [facility] [address]   [address] [place]  [facility] [address]  Phone: [phone] Fax: [phone]      Subjective    LMP:  12/24/24  Uterus: Yes  Ovaries: Yes    Movement:   Joint pain? No - has back pain/arthritis in the back (lumbar)  Osteopenia or osteoporosis: No    Lifestyle  Stress Level (rate 1-10): 2 out of 10  No results found for: \"CORTFREE\"  Energy Level (rate 1-10): 7 out of 10  Sleep? With MHT \"Amazing\" prior was \"rough\"   Sleep apnea?  report she snores   Daytime brain fog/memory troubles? Yes  How many steps/day on average? Running 6 miles/week, walking 6 miles/week  Recommend 7,000 steps per day, 7 days per week. Use a tracker for this such as oura, fit bit, whoop, etc.  Strength training? Piliates 5-6 times/week  Recommend 2-3x/week for 20 minutes  Consider listening to podcast interviews with Mily Gao  Current dietary pattern (if following a specific pattern of eating):   Gluten free (gluten sensitive, daughter celiac), has on occasion  Has been trying to increase protein and lower carbohydrates  Has added a high protein breakfast      Toxins:  Tobacco? No  Alcohol? Occasionally - 1x/month socially  THC/CBD? No  Heating food in plastic? YES  Plastic water bottles? YES    History of Gout? No- soda 1x/day of coke zero  BP Readings from Last 3 Encounters:   09/20/24 107/68   03/19/24 118/84   03/11/22 120/82     No results found for: \"URICACID\"    If yes or high fructose diet (soda, hfcs, agave, fruit juice, etc.) or high animal based protein diet order uric acid level.   Uric acid levels " "above 5.5 mg/dL are linked to increased risk of metabolic diseases.   Fructose metabolism generates uric acid, signaling to the body to increase storage of fat.   Uric acid decreases nitric oxide, constricting blood vessels and impairing insulin function.   High sodium intake can increase uric acid by signaling dehydration, but staying hydrated dilutes sodiums effect.   Elevated levels of uric acid increase hunger and impulsivity.     Medications potentially contributing to weight gain:   Escitalopram  Has noticed weight gain since starting, reduced from 10mg to 5mg to see if would help, weight maintained    Medications tried/stopped for weight management:    None       Weight Loss Drug Therapy Options Screening:     Naltrexone and Bupropion  Binge eating: No  Depression: Yes- currently on medication  PMH of Suicidal Ideation: No  Avoid use in patients with:  Uncontrolled hypertension:   Seizure disorder: no  Eating disorder: no  Use of other bupropion-containing products: no  Chronic opioid use: no  Avoid use if on opioids. Patients treated with naltrexone may respond to lower opioid doses than previously used. This could result in potentially life-threatening opioid intoxication. Warn patients that any attempt to overcome opioid blockade during naltrexone therapy is dangerous and could potentially lead to fatal opioid overdose.  Within 14 days of monoamine oxidase inhibitors: no    GLP-1 and Metformin:     Pre-Diabetes/Diabetes? No- family history  Lab Results   Component Value Date    INSULFAST 8 01/17/2025    HGBA1C 5.0 01/17/2025     No results found for: \"HSCRP\"    Pertinent PMH Review:   PMH of Pancreatitis: No  PMH of Gallbladder disease: No  PMH of Delayed Gastric Emptying:   GI issues? IBS, gluten sensitive  Frequency of BM? Diarrhea daily life long, made worse by caffeine  PMH of MTC: NO  PMH of Retinopathy: No  Any plans for a pregnancy in the next year? No    Topiramate:   Migraines? None    Adipex: " "  Anxiety? Yes    Thyroid health:   Lab Results   Component Value Date    TSH 2.26 01/17/2025    FREET4 1.08 01/17/2025    THYROIDPAB <28 01/17/2025        MHT- on MHT tolerating well, no side effects  No results found for: \"ESTRADIOL\", \"TESTOST\", \"MFTEST\", \"MTESTO\", \"FSH\"  Any Contraindications and/or Cautions to HT:     Concurrent Chronic Medical Conditions    Cardiovascular Health  The ASCVD Risk score (Katty RG, et al., 2019) failed to calculate for the following reasons:    Cannot find a previous HDL lab    Cannot find a previous total cholesterol lab  No results found for: \"CHOL\"  No results found for: \"HDL\"  No results found for: \"TRIG\"     No results found for: \"SBP\", \"DBP\"    Iron/B12/Folate Status  No results found for: \"IRON\", \"TIBC\", \"FERRITIN\"   No results found for: \"RIWRELQQ30\", \"FOLATE\"    Liver Function  Lab Results   Component Value Date    AST 20 01/17/2025    ALT 24 01/17/2025        Kidney Function  Lab Results   Component Value Date    CREATININE 0.90 01/17/2025       Vitamin D3  Lab Results   Component Value Date    VITD25 43 01/17/2025       Current Outpatient Medications on File Prior to Visit   Medication Sig Dispense Refill    cetirizine (ZyrTEC) 10 mg tablet,disintegrating Take 10 mg by mouth once daily.      escitalopram (Lexapro) 5 mg tablet Take 1 tablet (5 mg) by mouth once daily.      estradiol (Estrace) 1 mg tablet Take 1 tablet (1 mg) by mouth once daily. 90 tablet 3    progesterone (Prometrium) 200 mg capsule Take 1 capsule (200 mg) by mouth once daily. take one pill by mouth at bedtime 30 capsule 11    tirzepatide, weight loss, (Zepbound) 2.5 mg/0.5 mL solution Inject 2.5 mg under the skin every 7 days. ICD-10 code: z68.28 and g47.30 2 mL 11     No current facility-administered medications on file prior to visit.        Lifestyle and Nourishment Recommendations (please review following our appointment)  Focus on whole foods as close to nature as possible (less than 5 ingredients " on the label)  The order of eating matters during each meal, in order to minimize blood sugar spikes  First, 1/3 to 1/2 of meal as colorful vegetables eaten first  Increase fiber 25-30 grams daily (work up slowly to avoid GI distress)  Goal: Daily bowel movement  Second, eat protein and fat as part of your meal  Increase high quality protein to 25-30 grams per meal along with 2-3 x/week resistance training  May consider 3g/day of Creatine (CreaPure) daily mixed in water to support mood, cognition, muscle mass, and bone mass.    Third, have complex carbohydrates/starches as part of your meal  Consider drinking 1 tbsp of apple cider vinegar in a tall glass of water or as a salad dressing with extra virgin olive oil up to 20 minutes prior to or during a meal.   Helps to reduce blood sugar spikes from complex carbohydrates by up to 30%.   Beverages: Focus on filtered water, organic tea (loose leave or in paper, avoid plastic sachets), or sparkling/mineral water (ex. Spindrift, Oscar)   No more than 1 cup (8oz) of organic coffee daily prior to noon. May consider organic green tea.   Avoid alcohol   Avoid ALL artificial sweeteners   Focus on building muscle- muscle is metabolic tissue  Strength training 2-3 times weekly for at least 20 minutes, max out at 8 reps, rest, repeat x 3.   Walk or move for 10 minutes after each meal - ideally outdoors  Goal: 7,000 steps per day, 7 days per week. Use a tracker for this such as oura, fit bit, whoop, etc.   Manage stress: minimum of 2-5 minutes daily for meditation, minfulness, etc. to reduce cortisol levels.  Prioritize 7-9 hours of restful sleep nightly.   Turn off electronics 1 hour prior to bedtime, no electronics in the bedroom.  Establish a regular sleep/wake time (+/- 30 minutes)     Medication and allergy reconciliation completed     Drug Interactions   No significant drug interactions identified    Assessment/Plan     Patients goals:   Be in better health, avoid  "diabetes  Would like clothes to fit more comfortably   Discussed patients history, current medical conditions, medications, as well as current diet and lifestyle in depth.   Patient has been making diet/lifestyle changes for: >15 years  Current weight: 171 lbs  BMI: 28  Desired weight: 140-145 lbs  Concurrent medical conditions: Recently has started snoring (sleep apnea). IBS.   LARISA-IR 1.8, >2 indicates potential insulin resistance  Lifestyle:   Works out daily at 8am - is experiencing nausea during the workout  Noticing she's not as tolerant of coffee in the morning (down to 1 cup/day)  Recommend protein forward breakfast before coffee  Very active, piliates 5-6 days/week  History of dieting when younger, trying to include mindfulness and dieting now, has two daughters.   Has been avoiding plastic and stopped soda since our last visit.   Since starting Zepbound \"has been great for my IBS\" - gassy during the first few days after injection, daily diarrhea has resolved.   Discussed gas-x and/or fennel tea    Zepbound  Dose on Fridays, first two days notices more symptoms, then improves.   Has been experiencing nausea and tiredness  No troubles with obtaining or injecting medication  -8.8 lbs since starting  Average 0.5 lbs/week  She would like to stay at the current dose      CONTINUE  Zepbound 2.5mg once weekly (Fridays)      Follow-up: 4/28/25 9:20am     Time spent with pt: Total length of time 20 (minutes) of the encounter and more than 50% was spent counseling the patient.    Talia Pruitt, Pharm.D, FAM, IFP, [facility]  Clinical Pharmacist  Pharmacy Services  [phone]    Continue all meds under the continuation of care with the referring provider and clinical pharmacy team.    Verbal consent to manage patient's drug therapy was obtained from the patient and/or an individual authorized to act on behalf of a patient. They were informed they may decline to participate or withdraw from participation in pharmacy " services at any time.

## 2025-04-25 ENCOUNTER — HOSPITAL ENCOUNTER (OUTPATIENT)
Dept: RADIOLOGY | Facility: CLINIC | Age: 50
Discharge: HOME | End: 2025-04-25
Payer: COMMERCIAL

## 2025-04-25 VITALS — BODY MASS INDEX: 27.84 KG/M2 | HEIGHT: 65 IN | WEIGHT: 167.11 LBS

## 2025-04-25 DIAGNOSIS — Z01.419 ENCOUNTER FOR GYNECOLOGICAL EXAMINATION WITHOUT ABNORMAL FINDING: ICD-10-CM

## 2025-04-25 PROCEDURE — 77067 SCR MAMMO BI INCL CAD: CPT

## 2025-04-25 NOTE — PROGRESS NOTES
"  Patient ID: Shantel Almazan is a 50 y.o. female who presents for No chief complaint on file..    Referring Provider: Zeus Levi  Pt was referred for weight gain, on MHT     Preferred Pharmacy:    [facility]/pharmacy #[id number] - [place], [place] - [address] [address]  [address] [address] [facility] [address]  Phone: [phone] Fax: [phone]    [facility] [employer] [facility] [address]   [address]  [facility] [address]  Phone: [phone] Fax: [phone]      Subjective    LMP:  12/24/24  Uterus: Yes  Ovaries: Yes    Movement:   Joint pain? No - has back pain/arthritis in the back (lumbar)  Osteopenia or osteoporosis: No    Lifestyle  Stress Level (rate 1-10): 2 out of 10  No results found for: \"CORTFREE\"  Energy Level (rate 1-10): 7 out of 10  Sleep? With MHT \"Amazing\" prior was \"rough\"   Sleep apnea?  report she snores   Daytime brain fog/memory troubles? Yes  How many steps/day on average? Running 6 miles/week, walking 6 miles/week  Recommend 7,000 steps per day, 7 days per week. Use a tracker for this such as oura, fit bit, whoop, etc.  Strength training? Piliates 5-6 times/week  Recommend 2-3x/week for 20 minutes  Consider listening to podcast interviews with Mily Gao  Current dietary pattern (if following a specific pattern of eating):   Gluten free (gluten sensitive, daughter celiac), has on occasion  Has been trying to increase protein and lower carbohydrates  Has added a high protein breakfast      Toxins:  Tobacco? No  Alcohol? Occasionally - 1x/month socially  THC/CBD? No  Heating food in plastic? YES  Plastic water bottles? YES    History of Gout? No- soda 1x/day of coke zero  BP Readings from Last 3 Encounters:   09/20/24 107/68   03/19/24 118/84   03/11/22 120/82     No results found for: \"URICACID\"    If yes or high fructose diet (soda, hfcs, agave, fruit juice, etc.) or high animal based protein diet order uric acid level.   Uric acid levels " "above 5.5 mg/dL are linked to increased risk of metabolic diseases.   Fructose metabolism generates uric acid, signaling to the body to increase storage of fat.   Uric acid decreases nitric oxide, constricting blood vessels and impairing insulin function.   High sodium intake can increase uric acid by signaling dehydration, but staying hydrated dilutes sodiums effect.   Elevated levels of uric acid increase hunger and impulsivity.     Medications potentially contributing to weight gain:   Escitalopram  Has noticed weight gain since starting, reduced from 10mg to 5mg to see if would help, weight maintained    Medications tried/stopped for weight management:    None       Weight Loss Drug Therapy Options Screening:     Naltrexone and Bupropion  Binge eating: No  Depression: Yes- currently on medication  PMH of Suicidal Ideation: No  Avoid use in patients with:  Uncontrolled hypertension:   Seizure disorder: no  Eating disorder: no  Use of other bupropion-containing products: no  Chronic opioid use: no  Avoid use if on opioids. Patients treated with naltrexone may respond to lower opioid doses than previously used. This could result in potentially life-threatening opioid intoxication. Warn patients that any attempt to overcome opioid blockade during naltrexone therapy is dangerous and could potentially lead to fatal opioid overdose.  Within 14 days of monoamine oxidase inhibitors: no    GLP-1 and Metformin:     Pre-Diabetes/Diabetes? No- family history  Lab Results   Component Value Date    INSULFAST 8 01/17/2025    HGBA1C 5.0 01/17/2025     No results found for: \"HSCRP\"    Pertinent PMH Review:   PMH of Pancreatitis: No  PMH of Gallbladder disease: No  PMH of Delayed Gastric Emptying:   GI issues? IBS, gluten sensitive  Frequency of BM? Diarrhea daily life long, made worse by caffeine  PMH of MTC: NO  PMH of Retinopathy: No  Any plans for a pregnancy in the next year? No    Topiramate:   Migraines? None    Adipex: " "  Anxiety? Yes    Thyroid health:   Lab Results   Component Value Date    TSH 2.26 01/17/2025    FREET4 1.08 01/17/2025    THYROIDPAB <28 01/17/2025        MHT- on MHT tolerating well, no side effects  No results found for: \"ESTRADIOL\", \"TESTOST\", \"MFTEST\", \"MTESTO\", \"FSH\"  Any Contraindications and/or Cautions to HT:     Concurrent Chronic Medical Conditions    Cardiovascular Health  The ASCVD Risk score (Katty RG, et al., 2019) failed to calculate for the following reasons:    Cannot find a previous HDL lab    Cannot find a previous total cholesterol lab  No results found for: \"CHOL\"  No results found for: \"HDL\"  No results found for: \"TRIG\"     No results found for: \"SBP\", \"DBP\"    Iron/B12/Folate Status  No results found for: \"IRON\", \"TIBC\", \"FERRITIN\"   No results found for: \"SVSYTNRT17\", \"FOLATE\"    Liver Function  Lab Results   Component Value Date    AST 20 01/17/2025    ALT 24 01/17/2025        Kidney Function  Lab Results   Component Value Date    CREATININE 0.90 01/17/2025       Vitamin D3  Lab Results   Component Value Date    VITD25 43 01/17/2025       Current Outpatient Medications on File Prior to Visit   Medication Sig Dispense Refill    cetirizine (ZyrTEC) 10 mg tablet,disintegrating Take 10 mg by mouth once daily.      escitalopram (Lexapro) 5 mg tablet Take 1 tablet (5 mg) by mouth once daily.      estradiol (Estrace) 1 mg tablet TAKE 1 TABLET BY MOUTH ONCE DAILY. 90 tablet 3    progesterone (Prometrium) 200 mg capsule Take 1 capsule (200 mg) by mouth once daily. take one pill by mouth at bedtime 30 capsule 11    tirzepatide, weight loss, (Zepbound) 2.5 mg/0.5 mL solution Inject 2.5 mg under the skin every 7 days. ICD-10 code: z68.28 and g47.30 2 mL 11     No current facility-administered medications on file prior to visit.        Lifestyle and Nourishment Recommendations (please review following our appointment)  Focus on whole foods as close to nature as possible (less than 5 ingredients on the " label)  The order of eating matters during each meal, in order to minimize blood sugar spikes  First, 1/3 to 1/2 of meal as colorful vegetables eaten first  Increase fiber 25-30 grams daily (work up slowly to avoid GI distress)  Goal: Daily bowel movement  Second, eat protein and fat as part of your meal  Increase high quality protein to 25-30 grams per meal along with 2-3 x/week resistance training  May consider 3g/day of Creatine (CreaPure) daily mixed in water to support mood, cognition, muscle mass, and bone mass.    Third, have complex carbohydrates/starches as part of your meal  Consider drinking 1 tbsp of apple cider vinegar in a tall glass of water or as a salad dressing with extra virgin olive oil up to 20 minutes prior to or during a meal.   Helps to reduce blood sugar spikes from complex carbohydrates by up to 30%.   Beverages: Focus on filtered water, organic tea (loose leave or in paper, avoid plastic sachets), or sparkling/mineral water (ex. Spindrift, Oscar)   No more than 1 cup (8oz) of organic coffee daily prior to noon. May consider organic green tea.   Avoid alcohol   Avoid ALL artificial sweeteners   Focus on building muscle- muscle is metabolic tissue  Strength training 2-3 times weekly for at least 20 minutes, max out at 8 reps, rest, repeat x 3.   Walk or move for 10 minutes after each meal - ideally outdoors  Goal: 7,000 steps per day, 7 days per week. Use a tracker for this such as oura, fit bit, whoop, etc.   Manage stress: minimum of 2-5 minutes daily for meditation, minfulness, etc. to reduce cortisol levels.  Prioritize 7-9 hours of restful sleep nightly.   Turn off electronics 1 hour prior to bedtime, no electronics in the bedroom.  Establish a regular sleep/wake time (+/- 30 minutes)     Medication and allergy reconciliation completed     Drug Interactions   No significant drug interactions identified    Assessment/Plan     Patients goals:   Be in better health, avoid diabetes  Would  like clothes to fit more comfortably   Discussed patients history, current medical conditions, medications, as well as current diet and lifestyle in depth.   Patient has been making diet/lifestyle changes for: >15 years  Current weight: 171 lbs  BMI: 28  Desired weight: 140-145 lbs  Concurrent medical conditions: Recently has started snoring (sleep apnea). IBS.   LARISA-IR 1.8, >2 indicates potential insulin resistance  Lifestyle:   Works out daily at 8am - is experiencing nausea during the workout  Noticing she's not as tolerant of coffee in the morning (down to 1 cup/day)  Recommend protein forward breakfast before coffee  Very active, piliates 5-6 days/week    Zepbound  Dose on Fridays, notices variable nausea following dose.   Has been experiencing nausea and tiredness  No troubles with obtaining or injecting medication  -15 lbs since starting  She would like to stay at the current dose  Staying active, feeling great, feels exercise has gotten stronger.       CONTINUE  Zepbound 2.5mg once weekly (Fridays)      Follow-up: 6/23/25 9:20am     Time spent with pt: Total length of time 15 (minutes) of the encounter and more than 50% was spent counseling the patient.    Talia Pruitt, Pharm.D, FAIHM, IFP, [facility]  Clinical Pharmacist  Pharmacy Services  [phone]    Continue all meds under the continuation of care with the referring provider and clinical pharmacy team.    Verbal consent to manage patient's drug therapy was obtained from the patient and/or an individual authorized to act on behalf of a patient. They were informed they may decline to participate or withdraw from participation in pharmacy services at any time.

## 2025-04-28 ENCOUNTER — APPOINTMENT (OUTPATIENT)
Dept: PHARMACY | Facility: HOSPITAL | Age: 50
End: 2025-04-28
Payer: COMMERCIAL

## 2025-04-28 DIAGNOSIS — G47.30 SLEEP APNEA, UNSPECIFIED TYPE: ICD-10-CM

## 2025-04-28 DIAGNOSIS — R63.5 WEIGHT GAIN: ICD-10-CM

## 2025-06-23 ENCOUNTER — APPOINTMENT (OUTPATIENT)
Dept: PHARMACY | Facility: HOSPITAL | Age: 50
End: 2025-06-23
Payer: COMMERCIAL

## 2025-06-23 DIAGNOSIS — R63.5 WEIGHT GAIN: ICD-10-CM

## 2025-06-23 DIAGNOSIS — G47.30 SLEEP APNEA, UNSPECIFIED TYPE: ICD-10-CM

## 2025-06-23 RX ORDER — TIRZEPATIDE 2.5 MG/.5ML
2.5 INJECTION, SOLUTION SUBCUTANEOUS
Qty: 2 ML | Refills: 11 | Status: SHIPPED | OUTPATIENT
Start: 2025-06-23

## 2025-06-23 NOTE — PROGRESS NOTES
"  Patient ID: Shantel Almazan is a 50 y.o. female who presents for No chief complaint on file..    Referring Provider: Zeus Levi  Pt was referred for weight gain, on MHT     Preferred Pharmacy:    [facility]/pharmacy #[id number] - [place], [place] - [address] [address]  [address] [facility] [address]  Phone: [phone] Fax: [phone]    [employer] [facility] [address]   [address] [place]  [facility] [address]  Phone: [phone] Fax: [phone]      Subjective    LMP:  12/24/24  Uterus: Yes  Ovaries: Yes    Movement:   Joint pain? No - has back pain/arthritis in the back (lumbar)  Osteopenia or osteoporosis: No    Lifestyle  Stress Level (rate 1-10): 2 out of 10  No results found for: \"CORTFREE\"  Energy Level (rate 1-10): 7 out of 10  Sleep? With MHT \"Amazing\" prior was \"rough\"   Sleep apnea?  report she snores   Daytime brain fog/memory troubles? Yes  How many steps/day on average? Running 6 miles/week, walking 6 miles/week  Recommend 7,000 steps per day, 7 days per week. Use a tracker for this such as oura, fit bit, whoop, etc.  Strength training? Piliates 5-6 times/week  Recommend 2-3x/week for 20 minutes  Consider listening to podcast interviews with Mily Gao  Current dietary pattern (if following a specific pattern of eating):   Gluten free (gluten sensitive, daughter celiac), has on occasion  Has been trying to increase protein and lower carbohydrates  Has added a high protein breakfast      Toxins:  Tobacco? No  Alcohol? Occasionally - 1x/month socially  THC/CBD? No  Heating food in plastic? YES  Plastic water bottles? YES    History of Gout? No- soda 1x/day of coke zero  BP Readings from Last 3 Encounters:   09/20/24 107/68   03/19/24 118/84   03/11/22 120/82     No results found for: \"URICACID\"    If yes or high fructose diet (soda, hfcs, agave, fruit juice, etc.) or high animal based protein diet order uric acid level.   Uric acid levels " "above 5.5 mg/dL are linked to increased risk of metabolic diseases.   Fructose metabolism generates uric acid, signaling to the body to increase storage of fat.   Uric acid decreases nitric oxide, constricting blood vessels and impairing insulin function.   High sodium intake can increase uric acid by signaling dehydration, but staying hydrated dilutes sodiums effect.   Elevated levels of uric acid increase hunger and impulsivity.     Medications potentially contributing to weight gain:   Escitalopram  Has noticed weight gain since starting, reduced from 10mg to 5mg to see if would help, weight maintained    Medications tried/stopped for weight management:    None       Weight Loss Drug Therapy Options Screening:     Naltrexone and Bupropion  Binge eating: No  Depression: Yes- currently on medication  PMH of Suicidal Ideation: No  Avoid use in patients with:  Uncontrolled hypertension:   Seizure disorder: no  Eating disorder: no  Use of other bupropion-containing products: no  Chronic opioid use: no  Avoid use if on opioids. Patients treated with naltrexone may respond to lower opioid doses than previously used. This could result in potentially life-threatening opioid intoxication. Warn patients that any attempt to overcome opioid blockade during naltrexone therapy is dangerous and could potentially lead to fatal opioid overdose.  Within 14 days of monoamine oxidase inhibitors: no    GLP-1 and Metformin:     Pre-Diabetes/Diabetes? No- family history  Lab Results   Component Value Date    INSULFAST 8 01/17/2025    HGBA1C 5.0 01/17/2025     No results found for: \"HSCRP\"    Pertinent PMH Review:   PMH of Pancreatitis: No  PMH of Gallbladder disease: No  PMH of Delayed Gastric Emptying:   GI issues? IBS, gluten sensitive  Frequency of BM? Diarrhea daily life long, made worse by caffeine  PMH of MTC: NO  PMH of Retinopathy: No  Any plans for a pregnancy in the next year? No    Topiramate:   Migraines? None    Adipex: " "  Anxiety? Yes    Thyroid health:   Lab Results   Component Value Date    TSH 2.26 01/17/2025    FREET4 1.08 01/17/2025    THYROIDPAB <28 01/17/2025        MHT- on MHT tolerating well, no side effects  No results found for: \"ESTRADIOL\", \"TESTOST\", \"MFTEST\", \"MTESTO\", \"FSH\"  Any Contraindications and/or Cautions to HT:     Concurrent Chronic Medical Conditions    Cardiovascular Health  The ASCVD Risk score (Katty RG, et al., 2019) failed to calculate for the following reasons:    Cannot find a previous HDL lab    Cannot find a previous total cholesterol lab  No results found for: \"CHOL\"  No results found for: \"HDL\"  No results found for: \"TRIG\"     No results found for: \"SBP\", \"DBP\"    Iron/B12/Folate Status  No results found for: \"IRON\", \"TIBC\", \"FERRITIN\"   No results found for: \"ZDEMIHVS49\", \"FOLATE\"    Liver Function  Lab Results   Component Value Date    AST 20 01/17/2025    ALT 24 01/17/2025        Kidney Function  Lab Results   Component Value Date    CREATININE 0.90 01/17/2025       Vitamin D3  Lab Results   Component Value Date    VITD25 43 01/17/2025       Current Outpatient Medications on File Prior to Visit   Medication Sig Dispense Refill    cetirizine (ZyrTEC) 10 mg tablet,disintegrating Take 10 mg by mouth once daily.      escitalopram (Lexapro) 5 mg tablet Take 1 tablet (5 mg) by mouth once daily.      estradiol (Estrace) 1 mg tablet TAKE 1 TABLET BY MOUTH ONCE DAILY. 90 tablet 3    progesterone (Prometrium) 200 mg capsule Take 1 capsule (200 mg) by mouth once daily. take one pill by mouth at bedtime 30 capsule 11    tirzepatide, weight loss, (Zepbound) 2.5 mg/0.5 mL solution Inject 2.5 mg under the skin every 7 days. ICD-10 code: z68.28 and g47.30 2 mL 11     No current facility-administered medications on file prior to visit.        Lifestyle and Nourishment Recommendations (please review following our appointment)  Focus on whole foods as close to nature as possible (less than 5 ingredients on the " label)  The order of eating matters during each meal, in order to minimize blood sugar spikes  First, 1/3 to 1/2 of meal as colorful vegetables eaten first  Increase fiber 25-30 grams daily (work up slowly to avoid GI distress)  Goal: Daily bowel movement  Second, eat protein and fat as part of your meal  Increase high quality protein to 25-30 grams per meal along with 2-3 x/week resistance training  May consider 3g/day of Creatine (CreaPure) daily mixed in water to support mood, cognition, muscle mass, and bone mass.    Third, have complex carbohydrates/starches as part of your meal  Consider drinking 1 tbsp of apple cider vinegar in a tall glass of water or as a salad dressing with extra virgin olive oil up to 20 minutes prior to or during a meal.   Helps to reduce blood sugar spikes from complex carbohydrates by up to 30%.   Beverages: Focus on filtered water, organic tea (loose leave or in paper, avoid plastic sachets), or sparkling/mineral water (ex. Spindrift, Oscar)   No more than 1 cup (8oz) of organic coffee daily prior to noon. May consider organic green tea.   Avoid alcohol   Avoid ALL artificial sweeteners   Focus on building muscle- muscle is metabolic tissue  Strength training 2-3 times weekly for at least 20 minutes, max out at 8 reps, rest, repeat x 3.   Walk or move for 10 minutes after each meal - ideally outdoors  Goal: 7,000 steps per day, 7 days per week. Use a tracker for this such as oura, fit bit, whoop, etc.   Manage stress: minimum of 2-5 minutes daily for meditation, minfulness, etc. to reduce cortisol levels.  Prioritize 7-9 hours of restful sleep nightly.   Turn off electronics 1 hour prior to bedtime, no electronics in the bedroom.  Establish a regular sleep/wake time (+/- 30 minutes)     Medication and allergy reconciliation completed     Drug Interactions   No significant drug interactions identified    Assessment/Plan     Patients goals:   Be in better health, avoid diabetes  Would  like clothes to fit more comfortably   Discussed patients history, current medical conditions, medications, as well as current diet and lifestyle in depth.   Patient has been making diet/lifestyle changes for: >15 years  Current weight: 171 lbs  BMI: 28  Desired weight: 140-145 lbs  Concurrent medical conditions: Recently has started snoring (sleep apnea). IBS.   LARISA-IR 1.8, >2 indicates potential insulin resistance  Lifestyle:   Works out daily at 8am - is experiencing nausea during the workout  Noticing she's not as tolerant of coffee in the morning (down to 1 cup/day)  Recommend protein forward breakfast before coffee  Very active, piliates 5-6 days/week    Zepbound  Tolerating well, no side effects  Has lost 1 lb since last visit  -15.8 lbs since starting (151.2 lbs)  She would like to stay at the current dose  During the summer exercises the most - A+ on exercise, ran 5k recently!   Plans to increase protein, now she is tolerating whole foods better  Recommend InBody Scan      CONTINUE  Zepbound 2.5mg once weekly (Fridays)      Follow-up: 8/18/25 9:20am     Time spent with pt: Total length of time 15 (minutes) of the encounter and more than 50% was spent counseling the patient.    Talia Pruitt, Pharm.D, FAM, IFP, [facility]  Clinical Pharmacist  Pharmacy Services  [phone]    Continue all meds under the continuation of care with the referring provider and clinical pharmacy team.    Verbal consent to manage patient's drug therapy was obtained from the patient and/or an individual authorized to act on behalf of a patient. They were informed they may decline to participate or withdraw from participation in pharmacy services at any time.

## 2025-07-28 ENCOUNTER — TELEPHONE (OUTPATIENT)
Facility: CLINIC | Age: 50
End: 2025-07-28
Payer: COMMERCIAL

## 2025-08-18 ENCOUNTER — APPOINTMENT (OUTPATIENT)
Dept: PHARMACY | Facility: HOSPITAL | Age: 50
End: 2025-08-18
Payer: COMMERCIAL

## 2025-08-18 DIAGNOSIS — G47.30 SLEEP APNEA, UNSPECIFIED TYPE: ICD-10-CM

## 2025-08-18 DIAGNOSIS — R63.5 WEIGHT GAIN: ICD-10-CM

## 2025-09-26 ENCOUNTER — APPOINTMENT (OUTPATIENT)
Dept: OBSTETRICS AND GYNECOLOGY | Facility: CLINIC | Age: 50
End: 2025-09-26
Payer: COMMERCIAL

## 2025-09-30 ENCOUNTER — APPOINTMENT (OUTPATIENT)
Facility: CLINIC | Age: 50
End: 2025-09-30
Payer: COMMERCIAL

## 2025-10-13 ENCOUNTER — APPOINTMENT (OUTPATIENT)
Dept: PHARMACY | Facility: HOSPITAL | Age: 50
End: 2025-10-13
Payer: COMMERCIAL